# Patient Record
Sex: FEMALE | Race: WHITE | NOT HISPANIC OR LATINO | Employment: UNEMPLOYED | URBAN - NONMETROPOLITAN AREA
[De-identification: names, ages, dates, MRNs, and addresses within clinical notes are randomized per-mention and may not be internally consistent; named-entity substitution may affect disease eponyms.]

---

## 2024-03-31 ENCOUNTER — HOSPITAL ENCOUNTER (EMERGENCY)
Facility: HOSPITAL | Age: 44
Discharge: HOME | End: 2024-03-31
Payer: COMMERCIAL

## 2024-03-31 ENCOUNTER — APPOINTMENT (OUTPATIENT)
Dept: RADIOLOGY | Facility: HOSPITAL | Age: 44
End: 2024-03-31
Payer: COMMERCIAL

## 2024-03-31 VITALS
OXYGEN SATURATION: 98 % | SYSTOLIC BLOOD PRESSURE: 157 MMHG | RESPIRATION RATE: 14 BRPM | BODY MASS INDEX: 25.71 KG/M2 | WEIGHT: 160 LBS | HEART RATE: 69 BPM | DIASTOLIC BLOOD PRESSURE: 96 MMHG | HEIGHT: 66 IN | TEMPERATURE: 98 F

## 2024-03-31 DIAGNOSIS — R79.89 ELEVATED D-DIMER: Primary | ICD-10-CM

## 2024-03-31 LAB — D DIMER PPP FEU-MCNC: 797 NG/ML FEU

## 2024-03-31 PROCEDURE — 93970 EXTREMITY STUDY: CPT

## 2024-03-31 PROCEDURE — 99284 EMERGENCY DEPT VISIT MOD MDM: CPT | Mod: 25

## 2024-03-31 PROCEDURE — 85379 FIBRIN DEGRADATION QUANT: CPT | Performed by: PHYSICIAN ASSISTANT

## 2024-03-31 PROCEDURE — 93971 EXTREMITY STUDY: CPT | Performed by: RADIOLOGY

## 2024-03-31 PROCEDURE — 36415 COLL VENOUS BLD VENIPUNCTURE: CPT | Performed by: PHYSICIAN ASSISTANT

## 2024-03-31 ASSESSMENT — PAIN - FUNCTIONAL ASSESSMENT: PAIN_FUNCTIONAL_ASSESSMENT: 0-10

## 2024-03-31 ASSESSMENT — PAIN SCALES - GENERAL: PAINLEVEL_OUTOF10: 0 - NO PAIN

## 2024-03-31 NOTE — ED PROVIDER NOTES
HPI   Chief Complaint   Patient presents with    Abnormal Labs     Pt had a d-dimer test done on Friday IN [place]. Pt presents today with the result of the lab draw and a d-dimer of '0.98' and states she has family history of blood clots. Pt states er menstrual cycle has been different, more cramping with it but pt would not elaborate any further just stating it was different        History of present illness:  43-year-old female presents to the emergency room for complaints of elevated D-dimer.  She states that 2 years ago in 2022 she developed 3 blood clots with 2 being her leg and 1 being her chest.  She states that she has no previous medical history otherwise and she states at that time she was admitted to a hospital in [place] where she resides.  She states that she was placed on Eliquis at that time and states that she has been on Eliquis since about 6 months ago when she went off and she was placed on 2 baby aspirin a day which she has been compliant with.  She states that she gets quarterly D-dimers through her primary care doctor?  She states that she had the last one done just on Friday 2 days ago and came back positive at 900 and she was concerned that she might be having a blood clot in her leg?  She denies any leg pain chest pain shortness of breath nausea vomiting or any other symptoms at this time.  She states she did just travel here from [place] that was concerned and would like to have an ultrasound of her legs.    Social history: Negative for alcohol and drug use.    Review of systems:   Gen.: No weight loss, fatigue, anorexia, insomnia, fever.   Eyes: No vision loss, double vision, drainage, eye pain.   ENT: No pharyngitis, dry mouth.   Cardiac: No chest pain, palpitations, syncope, near syncope.   Pulmonary: No shortness of breath, cough, hemoptysis.   Heme/lymph: No swollen glands, fever, bleeding.   GI: No abdominal pain, change in bowel habits, melena, hematemesis, hematochezia, nausea,  vomiting, diarrhea.   : No discharge, dysuria, frequency, urgency, hematuria.   Musculoskeletal: No limb pain, joint pain, joint swelling.   Skin: No rashes.   Review of systems is otherwise negative unless stated above or in history of present illness.        Physical exam:  General: Vitals noted, no distress. Afebrile.   EENT: No lymphadenopathy appreciated  Cardiac: Regular, rate, rhythm, no murmur.   Pulmonary: Lungs clear bilaterally with good aeration. No adventitious breath sounds.   Abdomen: Soft, nonsurgical. Nontender. No peritoneal signs. Normoactive bowel sounds.   Extremities: No peripheral edema.   Skin: No rash.   Neuro: No focal neurologic deficits        Medical decision making:   Testing: D-dimer here was 797, ultrasound of the legs bilaterally is unremarkable as interpreted by radiology  Plan: Home-going.  Discussed differential. Will follow-up with the primary physician in the next 2-3 days. Return if worse. They understand return precautions and discharge instructions. Patient and family/friend/caregiver are in agreement with this plan. 43-year-old female presents to the emergency room for complaints of elevated D-dimer.  She states that 2 years ago in 2022 she developed 3 blood clots with 2 being her leg and 1 being her chest.  She states that she has no previous medical history otherwise and she states at that time she was admitted to a hospital in [place] where she resides.  She states that she was placed on Eliquis at that time and states that she has been on Eliquis since about 6 months ago when she went off and she was placed on 2 baby aspirin a day which she has been compliant with.  She states that she gets quarterly D-dimers through her primary care doctor?  She states that she had the last one done just on Friday 2 days ago and came back positive at 900 and she was concerned that she might be having a blood clot in her leg?  She denies any leg pain chest pain shortness of breath  nausea vomiting or any other symptoms at this time.  She states she did just travel here from [place] that was concerned and would like to have an ultrasound of her legs.  Physical exam of the legs there is no peripheral edema there is no pain to palpation across the legs negative Homans' sign bilaterally she is able to extend and flex her legs and no difficulty.  She does not appear to be in any acute distress at this time.  Explained to the blood work testing at this time and all imaging was also performed and explained to her that there is no acute findings.  I explained to her that I felt comfortable returning home at this time and explained to her that the D-dimer has significant limitations and they can be elevated for a number of reasons that did not include blood clots.  She was discharged at this time and encouraged to return the event she had any chest pain or shortness of breath or developed leg pain.  Impression:   1.  Elevated D-dimer            History provided by:  Patient   used: No                        No data recorded                   Patient History   No past medical history on file.  No past surgical history on file.  No family history on file.  Social History     Tobacco Use    Smoking status: Not on file    Smokeless tobacco: Not on file   Substance Use Topics    Alcohol use: Not on file    Drug use: Not on file       Physical Exam   ED Triage Vitals [03/31/24 1605]   Temperature Heart Rate Respirations BP   36.7 °C (98 °F) 69 14 (!) 157/96      Pulse Ox Temp Source Heart Rate Source Patient Position   98 % Tympanic -- Sitting      BP Location FiO2 (%)     Left arm --       Physical Exam    ED Course & MDM   Diagnoses as of 03/31/24 1825   Elevated d-dimer       Medical Decision Making      Procedure  Procedures     Markell Suarez PA-C  03/31/24 182